# Patient Record
Sex: MALE | Race: WHITE | NOT HISPANIC OR LATINO | Employment: OTHER | URBAN - METROPOLITAN AREA
[De-identification: names, ages, dates, MRNs, and addresses within clinical notes are randomized per-mention and may not be internally consistent; named-entity substitution may affect disease eponyms.]

---

## 2023-01-23 ENCOUNTER — HOSPITAL ENCOUNTER (OUTPATIENT)
Facility: CLINIC | Age: 83
Setting detail: OBSERVATION
Discharge: HOME | End: 2023-01-24
Attending: EMERGENCY MEDICINE | Admitting: HOSPITALIST
Payer: MEDICARE

## 2023-01-23 DIAGNOSIS — R55 SYNCOPE, UNSPECIFIED SYNCOPE TYPE: ICD-10-CM

## 2023-01-23 DIAGNOSIS — R19.7 VOMITING AND DIARRHEA: ICD-10-CM

## 2023-01-23 DIAGNOSIS — R11.10 VOMITING AND DIARRHEA: ICD-10-CM

## 2023-01-23 LAB
ALBUMIN SERPL BCG-MCNC: 4.1 G/DL (ref 3.5–5.2)
ALP SERPL-CCNC: 109 U/L (ref 40–129)
ALT SERPL W P-5'-P-CCNC: 15 U/L (ref 10–50)
ANION GAP SERPL CALCULATED.3IONS-SCNC: 13 MMOL/L (ref 7–15)
AST SERPL W P-5'-P-CCNC: 23 U/L (ref 10–50)
BASOPHILS # BLD AUTO: 0 10E3/UL (ref 0–0.2)
BASOPHILS NFR BLD AUTO: 0 %
BILIRUB SERPL-MCNC: 0.6 MG/DL
BUN SERPL-MCNC: 23.7 MG/DL (ref 8–23)
CALCIUM SERPL-MCNC: 9 MG/DL (ref 8.8–10.2)
CHLORIDE SERPL-SCNC: 102 MMOL/L (ref 98–107)
CREAT SERPL-MCNC: 1.08 MG/DL (ref 0.67–1.17)
DEPRECATED HCO3 PLAS-SCNC: 23 MMOL/L (ref 22–29)
EOSINOPHIL # BLD AUTO: 0.1 10E3/UL (ref 0–0.7)
EOSINOPHIL NFR BLD AUTO: 1 %
ERYTHROCYTE [DISTWIDTH] IN BLOOD BY AUTOMATED COUNT: 13 % (ref 10–15)
FLUAV RNA SPEC QL NAA+PROBE: NEGATIVE
FLUBV RNA RESP QL NAA+PROBE: NEGATIVE
GFR SERPL CREATININE-BSD FRML MDRD: 68 ML/MIN/1.73M2
GLUCOSE SERPL-MCNC: 136 MG/DL (ref 70–99)
HCT VFR BLD AUTO: 43 % (ref 40–53)
HGB BLD-MCNC: 14 G/DL (ref 13.3–17.7)
IMM GRANULOCYTES # BLD: 0 10E3/UL
IMM GRANULOCYTES NFR BLD: 0 %
LIPASE SERPL-CCNC: 22 U/L (ref 13–60)
LYMPHOCYTES # BLD AUTO: 0.6 10E3/UL (ref 0.8–5.3)
LYMPHOCYTES NFR BLD AUTO: 5 %
MCH RBC QN AUTO: 30.2 PG (ref 26.5–33)
MCHC RBC AUTO-ENTMCNC: 32.6 G/DL (ref 31.5–36.5)
MCV RBC AUTO: 93 FL (ref 78–100)
MONOCYTES # BLD AUTO: 0.4 10E3/UL (ref 0–1.3)
MONOCYTES NFR BLD AUTO: 3 %
NEUTROPHILS # BLD AUTO: 10.7 10E3/UL (ref 1.6–8.3)
NEUTROPHILS NFR BLD AUTO: 91 %
NRBC # BLD AUTO: 0 10E3/UL
NRBC BLD AUTO-RTO: 0 /100
PLATELET # BLD AUTO: 147 10E3/UL (ref 150–450)
POTASSIUM SERPL-SCNC: 3.9 MMOL/L (ref 3.4–5.3)
PROT SERPL-MCNC: 6.8 G/DL (ref 6.4–8.3)
RBC # BLD AUTO: 4.64 10E6/UL (ref 4.4–5.9)
RSV RNA SPEC NAA+PROBE: NEGATIVE
SARS-COV-2 RNA RESP QL NAA+PROBE: NEGATIVE
SODIUM SERPL-SCNC: 138 MMOL/L (ref 136–145)
TROPONIN T SERPL HS-MCNC: 18 NG/L
WBC # BLD AUTO: 11.8 10E3/UL (ref 4–11)

## 2023-01-23 PROCEDURE — 96361 HYDRATE IV INFUSION ADD-ON: CPT

## 2023-01-23 PROCEDURE — 99285 EMERGENCY DEPT VISIT HI MDM: CPT | Mod: 25,CS

## 2023-01-23 PROCEDURE — 87637 SARSCOV2&INF A&B&RSV AMP PRB: CPT | Performed by: EMERGENCY MEDICINE

## 2023-01-23 PROCEDURE — 93005 ELECTROCARDIOGRAM TRACING: CPT

## 2023-01-23 PROCEDURE — C9803 HOPD COVID-19 SPEC COLLECT: HCPCS

## 2023-01-23 PROCEDURE — 83690 ASSAY OF LIPASE: CPT | Performed by: EMERGENCY MEDICINE

## 2023-01-23 PROCEDURE — C9113 INJ PANTOPRAZOLE SODIUM, VIA: HCPCS | Performed by: PHYSICIAN ASSISTANT

## 2023-01-23 PROCEDURE — 84484 ASSAY OF TROPONIN QUANT: CPT | Performed by: EMERGENCY MEDICINE

## 2023-01-23 PROCEDURE — 85025 COMPLETE CBC W/AUTO DIFF WBC: CPT | Performed by: EMERGENCY MEDICINE

## 2023-01-23 PROCEDURE — 250N000011 HC RX IP 250 OP 636: Performed by: PHYSICIAN ASSISTANT

## 2023-01-23 PROCEDURE — G0378 HOSPITAL OBSERVATION PER HR: HCPCS

## 2023-01-23 PROCEDURE — 258N000003 HC RX IP 258 OP 636: Performed by: PHYSICIAN ASSISTANT

## 2023-01-23 PROCEDURE — 96360 HYDRATION IV INFUSION INIT: CPT

## 2023-01-23 PROCEDURE — 99222 1ST HOSP IP/OBS MODERATE 55: CPT | Performed by: PHYSICIAN ASSISTANT

## 2023-01-23 PROCEDURE — 36415 COLL VENOUS BLD VENIPUNCTURE: CPT | Performed by: EMERGENCY MEDICINE

## 2023-01-23 PROCEDURE — 96374 THER/PROPH/DIAG INJ IV PUSH: CPT

## 2023-01-23 PROCEDURE — 258N000003 HC RX IP 258 OP 636: Performed by: EMERGENCY MEDICINE

## 2023-01-23 PROCEDURE — 80053 COMPREHEN METABOLIC PANEL: CPT | Performed by: EMERGENCY MEDICINE

## 2023-01-23 RX ORDER — SODIUM CHLORIDE 9 MG/ML
INJECTION, SOLUTION INTRAVENOUS CONTINUOUS
Status: DISCONTINUED | OUTPATIENT
Start: 2023-01-23 | End: 2023-01-23

## 2023-01-23 RX ORDER — PROCHLORPERAZINE MALEATE 5 MG
5 TABLET ORAL EVERY 6 HOURS PRN
Status: DISCONTINUED | OUTPATIENT
Start: 2023-01-23 | End: 2023-01-24 | Stop reason: HOSPADM

## 2023-01-23 RX ORDER — ATORVASTATIN CALCIUM 20 MG/1
20 TABLET, FILM COATED ORAL DAILY
COMMUNITY

## 2023-01-23 RX ORDER — SODIUM CHLORIDE 9 MG/ML
INJECTION, SOLUTION INTRAVENOUS CONTINUOUS
Status: ACTIVE | OUTPATIENT
Start: 2023-01-23 | End: 2023-01-24

## 2023-01-23 RX ORDER — LANOLIN ALCOHOL/MO/W.PET/CERES
1000 CREAM (GRAM) TOPICAL DAILY
COMMUNITY

## 2023-01-23 RX ORDER — ALBUTEROL SULFATE 90 UG/1
2 AEROSOL, METERED RESPIRATORY (INHALATION) EVERY 4 HOURS PRN
COMMUNITY

## 2023-01-23 RX ORDER — PROCHLORPERAZINE 25 MG
12.5 SUPPOSITORY, RECTAL RECTAL EVERY 12 HOURS PRN
Status: DISCONTINUED | OUTPATIENT
Start: 2023-01-23 | End: 2023-01-24 | Stop reason: HOSPADM

## 2023-01-23 RX ORDER — ASPIRIN 81 MG/1
81 TABLET ORAL DAILY
Status: DISCONTINUED | OUTPATIENT
Start: 2023-01-24 | End: 2023-01-24 | Stop reason: HOSPADM

## 2023-01-23 RX ORDER — ONDANSETRON 4 MG/1
4 TABLET, ORALLY DISINTEGRATING ORAL EVERY 6 HOURS PRN
Status: DISCONTINUED | OUTPATIENT
Start: 2023-01-23 | End: 2023-01-24 | Stop reason: HOSPADM

## 2023-01-23 RX ORDER — ONDANSETRON 2 MG/ML
4 INJECTION INTRAMUSCULAR; INTRAVENOUS EVERY 6 HOURS PRN
Status: DISCONTINUED | OUTPATIENT
Start: 2023-01-23 | End: 2023-01-24 | Stop reason: HOSPADM

## 2023-01-23 RX ORDER — AMLODIPINE BESYLATE 5 MG/1
10 TABLET ORAL DAILY
Status: DISCONTINUED | OUTPATIENT
Start: 2023-01-24 | End: 2023-01-24 | Stop reason: HOSPADM

## 2023-01-23 RX ORDER — LIDOCAINE 40 MG/G
CREAM TOPICAL
Status: DISCONTINUED | OUTPATIENT
Start: 2023-01-23 | End: 2023-01-24 | Stop reason: HOSPADM

## 2023-01-23 RX ORDER — AMLODIPINE BESYLATE 10 MG/1
10 TABLET ORAL DAILY
COMMUNITY

## 2023-01-23 RX ORDER — ATORVASTATIN CALCIUM 10 MG/1
20 TABLET, FILM COATED ORAL DAILY
Status: DISCONTINUED | OUTPATIENT
Start: 2023-01-24 | End: 2023-01-24 | Stop reason: HOSPADM

## 2023-01-23 RX ORDER — ASPIRIN 81 MG/1
81 TABLET ORAL DAILY
COMMUNITY

## 2023-01-23 RX ORDER — VITAMIN B COMPLEX
1 TABLET ORAL DAILY
COMMUNITY

## 2023-01-23 RX ADMIN — PANTOPRAZOLE SODIUM 40 MG: 40 INJECTION, POWDER, FOR SOLUTION INTRAVENOUS at 22:20

## 2023-01-23 RX ADMIN — SODIUM CHLORIDE 1000 ML: 9 INJECTION, SOLUTION INTRAVENOUS at 17:45

## 2023-01-23 RX ADMIN — SODIUM CHLORIDE: 9 INJECTION, SOLUTION INTRAVENOUS at 22:23

## 2023-01-23 ASSESSMENT — ACTIVITIES OF DAILY LIVING (ADL)
ADLS_ACUITY_SCORE: 35

## 2023-01-23 ASSESSMENT — ENCOUNTER SYMPTOMS
BLOOD IN STOOL: 0
DIARRHEA: 1
VOMITING: 1
FEVER: 0
HEADACHES: 0
NAUSEA: 1

## 2023-01-23 NOTE — ED PROVIDER NOTES
History   Chief Complaint:  Nausea, Vomiting, & Diarrhea     The history is provided by the patient.      Manfred Nava is a 82 year old male with a history of lung cancer, hypertension and hyperlipidemia who presents via with nausea, vomiting and diarrhea. The patient reports that today he was exercising at [id number] using free weights when he became lightheaded. He states that he then went upstairs, where he began having nausea and vomiting, as well as diarrhea. He reports that he had lunch with his wife at [employer] and was unable to eat much due to symptoms. He reports that his symptoms have worsened. He denies any recent travel, fever, any black or bloody stool/vomit, headache, chest pain, any recent antibiotics, or any suspicious foods. He denies that his wife is sick. He reports that he does not have a history of heart failure or Cdiff and is not on a water pill. The patient has a history of lung cancer. His last chemotherapy treatment was 3 months ago and he is now in remission. Prior to his arrival in the ED, EMS gave him 4 mg of Zofran and 100 ml of fluid.    ROS:  Review of Systems   Constitutional: Negative for fever.   Cardiovascular: Negative for chest pain.   Gastrointestinal: Positive for diarrhea, nausea and vomiting. Negative for blood in stool.   Neurological: Negative for headaches.   All other systems reviewed and are negative.    Allergies:  No Known Allergies     Medications:    Omeprazole  Atorvastatin  Amlodipine   Albuterol inhaler    Past Medical History:    Hyperlipidemia  Hypertension  Cancer of right upper lobe of lung   Vasovagal syncope  Osteopenia  Ascending aortic aneurysm   COPD  Melanoma  BPH  Esophageal reflux, Schatzki's ring  Paralysis of right vocal cord  Pharyngoesophageal dysphasia  CAD  Wenckebach second degree AV block    Past Surgical History:    Left knee arthroscopy   Appendectomy  Lung lobectomy, right upper lobe    Social History:  Presents alone  Presents via  EMS  Lives with his wife  PCP: No primary care provider on file.     Physical Exam     Patient Vitals for the past 24 hrs:   BP Temp Temp src Pulse Resp SpO2   01/23/23 1945 137/83 -- -- 66 21 95 %   01/23/23 1815 127/68 -- -- 62 19 98 %   01/23/23 1800 125/72 -- -- 63 19 98 %   01/23/23 1745 115/69 -- -- 63 15 --   01/23/23 1737 115/74 98.2  F (36.8  C) Oral 72 18 95 %      Physical Exam  VS: Reviewed per above  HENT: normal speech, no external signs head trauma.  EYES: sclera anicteric  CV: Rate as noted, regular rhythm.   RESP: Effort normal. Breath sounds are normal bilaterally.  GI: no tenderness/rebound/guarding, not distended.  NEURO: Alert, moving all extremities, GCS 15  MSK: No deformity of the extremities  SKIN: Warm and dry    Emergency Department Course   ECG  ECG taken at 1753, ECG read at 1754  Normal sinus rhythm  Incomplete right bundle branch block  Borderline ECG  Rate 63 bpm. OR interval 208 ms. QRS duration 92 ms. QT/QTc 430/440 ms. P-R-T axes 47 10 55.     Laboratory:  Labs Ordered and Resulted from Time of ED Arrival to Time of ED Departure   COMPREHENSIVE METABOLIC PANEL - Abnormal       Result Value    Sodium 138      Potassium 3.9      Chloride 102      Carbon Dioxide (CO2) 23      Anion Gap 13      Urea Nitrogen 23.7 (*)     Creatinine 1.08      Calcium 9.0      Glucose 136 (*)     Alkaline Phosphatase 109      AST 23      ALT 15      Protein Total 6.8      Albumin 4.1      Bilirubin Total 0.6      GFR Estimate 68     CBC WITH PLATELETS AND DIFFERENTIAL - Abnormal    WBC Count 11.8 (*)     RBC Count 4.64      Hemoglobin 14.0      Hematocrit 43.0      MCV 93      MCH 30.2      MCHC 32.6      RDW 13.0      Platelet Count 147 (*)     % Neutrophils 91      % Lymphocytes 5      % Monocytes 3      % Eosinophils 1      % Basophils 0      % Immature Granulocytes 0      NRBCs per 100 WBC 0      Absolute Neutrophils 10.7 (*)     Absolute Lymphocytes 0.6 (*)     Absolute Monocytes 0.4      Absolute  Eosinophils 0.1      Absolute Basophils 0.0      Absolute Immature Granulocytes 0.0      Absolute NRBCs 0.0     LIPASE - Normal    Lipase 22     TROPONIN T, HIGH SENSITIVITY - Normal    Troponin T, High Sensitivity 18     INFLUENZA A/B & SARS-COV2 PCR MULTIPLEX - Normal    Influenza A PCR Negative      Influenza B PCR Negative      RSV PCR Negative      SARS CoV2 PCR Negative        Emergency Department Course & Assessments:     Interventions:  Medications   0.9% sodium chloride BOLUS (0 mLs Intravenous Stopped 1/23/23 1930)     Followed by   sodium chloride 0.9% infusion (has no administration in time range)        Consultations/Discussion of Management or Tests:  1933 I talked to Corrine MELGAR accepting for Dr. Britt, who accepts the patient for admission.     Assessments:   ED Course as of 01/23/23 2052 Mon Jan 23, 2023 1731 I obtained history and examined the patient as noted above.    1913 I rechecked the patient and updated them on results.       Disposition:  The patient was admitted to the hospital under the care of Dr. Britt.     Impression & Plan    Medical Decision Making:  Patient presents to the ER for evaluation of syncopal episode in the setting of vomiting and diarrhea x1 day.  Vital signs reassuring.  No focal neurodeficits or external signs of head trauma.  Discussed neuroimaging with CT scan and patient declined citing low concern for head injury.  Labs are reassuring.  Symptoms improved after IV fluids and antiemetics.  Suspect vasovagal syncope in the setting of GI illness.  In discussion with his daughter, she reports that patient had history of profound pauses with previous GI illness and there was discussion of possible pacemaker.  Given advanced age and history of profound neurocardiogenic syncopal events in the setting of GI illness, plan to observe overnight to ensure that patient does not have recurrent syncope at home.    Diagnosis:    ICD-10-CM    1. Syncope, unspecified  syncope type  R55       2. Vomiting and diarrhea  R11.10     R19.7             Scribe Disclosure:  I, SVITLANA PERES, am serving as a scribe at 8:46 PM on 1/23/2023 to document services personally performed by Yury Quezada MD based on my observations and the provider's statements to me.     Scribe Disclosure:  I, Alcides Montano, am serving as a scribe at 8:52 PM on 1/23/2023 to document services personally performed by Yury Quezada MD based on my observations and the provider's statements to me.    1/23/2023   Yury Quezada MD Lindenbaum, Elan, MD  01/23/23 5745

## 2023-01-23 NOTE — ED TRIAGE NOTES
Patient presents from home via EMS with N/V/D. Patient started feeling lightheaded when he was working out this morning at 10 AM. Patient began feeling progressively worse after eating lunch. Patient was sitting on toilet when EMS arrived to his house, started to feel dizzy, became pale and diaphoretic. Per EMS patient heart rate dropped to the 40s. No LOC. Patient given 4mg Zofran en route. Pt c/o lightheadedness upon arrival. Pt denies recent abx use, illness, cardiac history. A&Ox4. VSS.     Triage Assessment     Row Name 01/23/23 4150       Triage Assessment (Adult)    Airway WDL WDL       Respiratory WDL    Respiratory WDL WDL       Skin Circulation/Temperature WDL    Skin Circulation/Temperature WDL WDL       Cognitive/Neuro/Behavioral WDL    Cognitive/Neuro/Behavioral WDL WDL

## 2023-01-24 ENCOUNTER — TELEPHONE (OUTPATIENT)
Dept: EMERGENCY MEDICINE | Facility: CLINIC | Age: 83
End: 2023-01-24
Payer: MEDICARE

## 2023-01-24 VITALS
SYSTOLIC BLOOD PRESSURE: 143 MMHG | TEMPERATURE: 98.4 F | RESPIRATION RATE: 16 BRPM | OXYGEN SATURATION: 95 % | DIASTOLIC BLOOD PRESSURE: 81 MMHG | HEART RATE: 69 BPM

## 2023-01-24 PROBLEM — R55 SYNCOPE, UNSPECIFIED SYNCOPE TYPE: Status: ACTIVE | Noted: 2023-01-24

## 2023-01-24 LAB
ANION GAP SERPL CALCULATED.3IONS-SCNC: 9 MMOL/L (ref 7–15)
ATRIAL RATE - MUSE: 63 BPM
BASOPHILS # BLD AUTO: 0 10E3/UL (ref 0–0.2)
BASOPHILS NFR BLD AUTO: 0 %
BUN SERPL-MCNC: 21.5 MG/DL (ref 8–23)
C COLI+JEJUNI+LARI FUSA STL QL NAA+PROBE: NOT DETECTED
CALCIUM SERPL-MCNC: 8.2 MG/DL (ref 8.8–10.2)
CHLORIDE SERPL-SCNC: 108 MMOL/L (ref 98–107)
CREAT SERPL-MCNC: 0.9 MG/DL (ref 0.67–1.17)
DEPRECATED HCO3 PLAS-SCNC: 22 MMOL/L (ref 22–29)
DIASTOLIC BLOOD PRESSURE - MUSE: NORMAL MMHG
EC STX1 GENE STL QL NAA+PROBE: NOT DETECTED
EC STX2 GENE STL QL NAA+PROBE: NOT DETECTED
EOSINOPHIL # BLD AUTO: 0 10E3/UL (ref 0–0.7)
EOSINOPHIL NFR BLD AUTO: 0 %
ERYTHROCYTE [DISTWIDTH] IN BLOOD BY AUTOMATED COUNT: 13.2 % (ref 10–15)
GFR SERPL CREATININE-BSD FRML MDRD: 85 ML/MIN/1.73M2
GLUCOSE SERPL-MCNC: 111 MG/DL (ref 70–99)
HCT VFR BLD AUTO: 39.8 % (ref 40–53)
HGB BLD-MCNC: 12.8 G/DL (ref 13.3–17.7)
IMM GRANULOCYTES # BLD: 0 10E3/UL
IMM GRANULOCYTES NFR BLD: 0 %
INTERPRETATION ECG - MUSE: NORMAL
LYMPHOCYTES # BLD AUTO: 0.4 10E3/UL (ref 0.8–5.3)
LYMPHOCYTES NFR BLD AUTO: 6 %
MCH RBC QN AUTO: 30.2 PG (ref 26.5–33)
MCHC RBC AUTO-ENTMCNC: 32.2 G/DL (ref 31.5–36.5)
MCV RBC AUTO: 94 FL (ref 78–100)
MONOCYTES # BLD AUTO: 0.3 10E3/UL (ref 0–1.3)
MONOCYTES NFR BLD AUTO: 5 %
NEUTROPHILS # BLD AUTO: 6.2 10E3/UL (ref 1.6–8.3)
NEUTROPHILS NFR BLD AUTO: 89 %
NOROV GI+II ORF1-ORF2 JNC STL QL NAA+PR: DETECTED
NRBC # BLD AUTO: 0 10E3/UL
NRBC BLD AUTO-RTO: 0 /100
P AXIS - MUSE: 47 DEGREES
PLATELET # BLD AUTO: 132 10E3/UL (ref 150–450)
POTASSIUM SERPL-SCNC: 4 MMOL/L (ref 3.4–5.3)
PR INTERVAL - MUSE: 208 MS
QRS DURATION - MUSE: 92 MS
QT - MUSE: 430 MS
QTC - MUSE: 440 MS
R AXIS - MUSE: 10 DEGREES
RBC # BLD AUTO: 4.24 10E6/UL (ref 4.4–5.9)
RVA NSP5 STL QL NAA+PROBE: NOT DETECTED
SALMONELLA SP RPOD STL QL NAA+PROBE: NOT DETECTED
SHIGELLA SP+EIEC IPAH STL QL NAA+PROBE: NOT DETECTED
SODIUM SERPL-SCNC: 139 MMOL/L (ref 136–145)
SYSTOLIC BLOOD PRESSURE - MUSE: NORMAL MMHG
T AXIS - MUSE: 55 DEGREES
V CHOL+PARA RFBL+TRKH+TNAA STL QL NAA+PR: NOT DETECTED
VENTRICULAR RATE- MUSE: 63 BPM
WBC # BLD AUTO: 7 10E3/UL (ref 4–11)
Y ENTERO RECN STL QL NAA+PROBE: NOT DETECTED

## 2023-01-24 PROCEDURE — G0378 HOSPITAL OBSERVATION PER HR: HCPCS

## 2023-01-24 PROCEDURE — 96376 TX/PRO/DX INJ SAME DRUG ADON: CPT

## 2023-01-24 PROCEDURE — 99239 HOSP IP/OBS DSCHRG MGMT >30: CPT | Performed by: INTERNAL MEDICINE

## 2023-01-24 PROCEDURE — 250N000013 HC RX MED GY IP 250 OP 250 PS 637: Performed by: STUDENT IN AN ORGANIZED HEALTH CARE EDUCATION/TRAINING PROGRAM

## 2023-01-24 PROCEDURE — 87506 IADNA-DNA/RNA PROBE TQ 6-11: CPT | Performed by: PHYSICIAN ASSISTANT

## 2023-01-24 PROCEDURE — 85025 COMPLETE CBC W/AUTO DIFF WBC: CPT | Performed by: PHYSICIAN ASSISTANT

## 2023-01-24 PROCEDURE — C9113 INJ PANTOPRAZOLE SODIUM, VIA: HCPCS | Performed by: PHYSICIAN ASSISTANT

## 2023-01-24 PROCEDURE — 250N000011 HC RX IP 250 OP 636: Performed by: PHYSICIAN ASSISTANT

## 2023-01-24 PROCEDURE — 36415 COLL VENOUS BLD VENIPUNCTURE: CPT | Performed by: PHYSICIAN ASSISTANT

## 2023-01-24 PROCEDURE — 250N000013 HC RX MED GY IP 250 OP 250 PS 637: Performed by: PHYSICIAN ASSISTANT

## 2023-01-24 PROCEDURE — 96361 HYDRATE IV INFUSION ADD-ON: CPT

## 2023-01-24 PROCEDURE — 80048 BASIC METABOLIC PNL TOTAL CA: CPT | Performed by: PHYSICIAN ASSISTANT

## 2023-01-24 RX ORDER — CALCIUM CARBONATE 500 MG/1
500 TABLET, CHEWABLE ORAL 3 TIMES DAILY PRN
Status: DISCONTINUED | OUTPATIENT
Start: 2023-01-24 | End: 2023-01-24 | Stop reason: HOSPADM

## 2023-01-24 RX ADMIN — ATORVASTATIN CALCIUM 20 MG: 10 TABLET, FILM COATED ORAL at 08:18

## 2023-01-24 RX ADMIN — AMLODIPINE BESYLATE 10 MG: 5 TABLET ORAL at 08:17

## 2023-01-24 RX ADMIN — ASPIRIN 81 MG: 81 TABLET, COATED ORAL at 08:18

## 2023-01-24 RX ADMIN — CALCIUM CARBONATE (ANTACID) CHEW TAB 500 MG 500 MG: 500 CHEW TAB at 02:32

## 2023-01-24 RX ADMIN — PANTOPRAZOLE SODIUM 40 MG: 40 INJECTION, POWDER, FOR SOLUTION INTRAVENOUS at 08:22

## 2023-01-24 ASSESSMENT — ACTIVITIES OF DAILY LIVING (ADL)
ADLS_ACUITY_SCORE: 35
ADLS_ACUITY_SCORE: 35
ADLS_ACUITY_SCORE: 37
ADLS_ACUITY_SCORE: 35
ADLS_ACUITY_SCORE: 35
ADLS_ACUITY_SCORE: 37
ADLS_ACUITY_SCORE: 35
ADLS_ACUITY_SCORE: 35

## 2023-01-24 NOTE — ED NOTES
[facility]  ED Nurse Handoff Report    Manfred Nava is a 82 year old male   ED Chief complaint: Nausea, Vomiting, & Diarrhea  . ED Diagnosis:   Final diagnoses:   Syncope, unspecified syncope type   Vomiting and diarrhea     Allergies: No Known Allergies    Code Status: Full Code  Activity level - Baseline/Home:  Independent. Activity Level - Current:   Assist X 1. Lift room needed: No. Bariatric: No   Needed: No   Isolation: Yes. Infection: Not Applicable  C-Diff Pending.     Vital Signs:   Vitals:    01/23/23 1745 01/23/23 1800 01/23/23 1815 01/23/23 1945   BP: 115/69 125/72 127/68 137/83   Pulse: 63 63 62 66   Resp: 15 19 19 21   Temp:       TempSrc:       SpO2:  98% 98% 95%       Cardiac Rhythm:  ,   Cardiac  Cardiac Rhythm: Normal sinus rhythm  Pain level:    Patient confused: No. Patient Falls Risk: Yes.   Elimination Status:  Has not voided in ED    Patient Report - Initial Complaint: N/V/D. Focused Assessment: Manfred Nava is a 82 year old male with a history of lung cancer, hypertension and hyperlipidemia who presents with nausea, vomiting and diarrhea. Symptoms began this morning while patient was lifting weights per his usual routine around 10 AM. Patient went to lunch with his wife and began feeling progressively worse. Diarrheas watery every 5 minutes. No bloody or black stool or bloody vomit per patient. Patient had episode of dizziness, near syncope at home when EMS arrived. Patient c/o lightheadedness upon arrival. Denies headache, chest pain. No recent abx use, illness, suspicious foods. Lives at home with wife who is not symptomatic. History of lung cancer, now in remission.  Tests Performed:   No orders to display    . Abnormal Results:   Labs Ordered and Resulted from Time of ED Arrival to Time of ED Departure   COMPREHENSIVE METABOLIC PANEL - Abnormal       Result Value    Sodium 138      Potassium 3.9      Chloride 102      Carbon Dioxide (CO2) 23      Anion Gap 13       Urea Nitrogen 23.7 (*)     Creatinine 1.08      Calcium 9.0      Glucose 136 (*)     Alkaline Phosphatase 109      AST 23      ALT 15      Protein Total 6.8      Albumin 4.1      Bilirubin Total 0.6      GFR Estimate 68     CBC WITH PLATELETS AND DIFFERENTIAL - Abnormal    WBC Count 11.8 (*)     RBC Count 4.64      Hemoglobin 14.0      Hematocrit 43.0      MCV 93      MCH 30.2      MCHC 32.6      RDW 13.0      Platelet Count 147 (*)     % Neutrophils 91      % Lymphocytes 5      % Monocytes 3      % Eosinophils 1      % Basophils 0      % Immature Granulocytes 0      NRBCs per 100 WBC 0      Absolute Neutrophils 10.7 (*)     Absolute Lymphocytes 0.6 (*)     Absolute Monocytes 0.4      Absolute Eosinophils 0.1      Absolute Basophils 0.0      Absolute Immature Granulocytes 0.0      Absolute NRBCs 0.0     LIPASE - Normal    Lipase 22     TROPONIN T, HIGH SENSITIVITY - Normal    Troponin T, High Sensitivity 18     INFLUENZA A/B & SARS-COV2 PCR MULTIPLEX    .   Treatments provided: See MAR  Family Comments: Wife, daughter at bedside  OBS brochure/video discussed/provided to patient:  Yes  ED Medications:   Medications   0.9% sodium chloride BOLUS (0 mLs Intravenous Stopped 1/23/23 1930)     Followed by   sodium chloride 0.9% infusion (has no administration in time range)     Drips infusing:  No  For the majority of the shift, the patient's behavior Green. Interventions performed were N/A.    Sepsis treatment initiated: No     Patient tested for COVID 19 prior to admission: YES - pending    ED Nurse Name/Phone Number: Verna Garcia RN,   8:03 PM    RECEIVING UNIT ED HANDOFF REVIEW    Above ED Nurse Handoff Report was reviewed: Yes  Reviewed by: Annie Gonzalez RN on January 24, 2023 at 2:36 PM

## 2023-01-24 NOTE — PLAN OF CARE
Care from [id number]    Blood pressure 128/74, pulse 68, temperature 98.5  F (36.9  C), temperature source Oral, resp. rate 15, SpO2 96 %.    Admit Date: 1/23/23  Admitting Diagnosis: Vomiting and diarrhea  Neuro: Alert and Oriented x4  Activity: have not been out of bed yet  Telemetry Monitoring: Yes - a  Pain: denying pain.  Labs / Tests:   GI: pt had no diarrhea until 0530. Pt since has had 2 loose/watery BMs 1/24/23. C. Diff sample was sent at 0700  : Voiding, using Bedside urinal   Fluids: has Normal Saline 0.9% running at 100 mL per hour.  Diet: Regular  O2: RA  Consults:   Treatment:  Discharge Disposition: TBD From home with wife    Pt complaining of acid reflux, order for tums obtained and provided.

## 2023-01-24 NOTE — PHARMACY-ADMISSION MEDICATION HISTORY
Admission medication history interview status for this patient is complete. See [facility] admission navigator for allergy information, prior to admission medications and immunization status.     Medication history interview done, indicate source(s): Patient  Medication history resources (including written lists, pill bottles, clinic record): [facility] ([facility]) records, patient's outpatient pharmacy  Pharmacy: [place] [id number] [facility] [id number] [address]      Changes made to PTA medication list:  Added:     Rx:     Albuterol inhaler PRN    Amlodipine    Aspirin    Atorvastatin    OTC:    Vitamin B-12    Vitamin D3  Changed: none  Reported as Not Taking: none  Removed: simvastatin (pt taking atorvastatin instead)    Actions taken by pharmacist (provider contacted, etc):None     Additional medication history information:None    Medication reconciliation/reorder completed by provider prior to medication history?  N   (Y/N)       Medication Affordability:  1. Not including over the counter (OTC) medications, was there a time in the past 12 months when you did not take your medications as prescribed because of cost?  -no    Prior to Admission medications    Medication Sig Last Dose Taking? Auth Provider Long Term End Date   albuterol (PROAIR HFA/PROVENTIL HFA/VENTOLIN HFA) 108 (90 Base) MCG/ACT inhaler Inhale 2 puffs into the lungs every 4 hours as needed for shortness of breath, wheezing or cough More than a month Yes Unknown, Entered By History Yes    amLODIPine (NORVASC) 10 MG tablet Take 10 mg by mouth daily 1/23/2023 at AM Yes Unknown, Entered By History Yes    aspirin 81 MG EC tablet Take 81 mg by mouth daily 1/23/2023 at AM Yes Unknown, Entered By History     atorvastatin (LIPITOR) 20 MG tablet Take 20 mg by mouth daily 1/23/2023 at AM Yes Unknown, Entered By History Yes    cyanocobalamin (VITAMIN B-12) 1000 MCG tablet Take 1,000 mcg by mouth daily 1/23/2023 at AM Yes Unknown, Entered By  History     Vitamin D3 (CHOLECALCIFEROL) 25 mcg (1000 units) tablet Take 1 tablet by mouth daily 1/23/2023 at AM Yes Unknown, Entered By History       Nicollette McMann, PharmD, BCPS  [phone] (Med Reconciliation [facility] phone)

## 2023-01-24 NOTE — H&P
[facility]    History and Physical - Hospitalist Service       Date of Admission:  1/23/2023    Assessment & Plan      Manfred Nava is a 82 year old male with PMHx significant for COPD, ascending aortic aneurysm, nonobstructive CAD, vasovagal syncope, bradycardia with hx of pauses, intermittent Wenckebach heart block, HTN, HLP, BPH, s/p R upper lung wedge resection due to adenocarcinoma, and former tobacco use, who was admitted on 1/23/2023 with N/V/D. He also had a near syncope vs syncopal episode at home. EMS noted bradycardia into the 40s.    1. Gastroenteritis  Likely viral process. Developed sx this AM, nausea and vomiting x2, multiple loose watery stools today, no blood, no fevers. Syncope vs near syncope while at toilet.  Pt thinks its due to [employer] food.  - admit to OBS  - enteric panel  - IVFs NS @ 100 ml/hr  - regular diet as tolerated  - supportive cares  - IV Protonix BID, pt complains of heart burn    2. Hx of bradycardia with symptomatic pauses  Hx of vasovagal syncope with possible episode today  Hx of Wenckebach heart block  Pt has hx of syncope related to bradycardia and long pauses. Has noted Wenckebach heart block on cardiac event monitor and also while hospitalized. Followed up with Cardiology, discussed pacemaker but did not feel like he quite warranted one yet.   ED provider concerned with hx of vasovagal syncope and ongoing N/V/D due to #1.  - monitor sx here  - monitor on tele    3. COPD  S/p R upper lung wedge resection due to adenocarcinoma  Completed chemotherapy 3 months ago per pt.   Has prn albuterol inhaler, may use own home med  4. Ascending aortic aneurysm  Nonobstructive CAD  HTN  Resume home amlodipine with parameters  5. HLP  Resume home statin  6. BPH  No longer on Flomax  7. Former tobacco use     Diet:   regular diet as tolerated  DVT Prophylaxis: Low Risk/Ambulatory with no VTE prophylaxis indicated  Byers Catheter: Not present  Lines: None      Cardiac Monitoring: None  Code Status:   Full code     Clinically Significant Risk Factors Present on Admission                               Disposition Plan      Expected Discharge Date: 01/24/2023                The patient's care was discussed with the Patient and ED provider, Dr. Quezada.    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  He states he developed symptoms earlier today after eating [employer].  He notes multiple loose watery stools today and 2 episodes of vomiting.  He denies blood in his vomit or stool.  He denies fever, chills, chest pain, shortness of breath, dull pain, cramping, or dysuria.  He denies anyone else around him being sick.  He notes that he had a syncopal versus near syncopal episode while at the toilet today.  He notes a history of bradycardia with pauses and has discussed a pacemaker with cardiology in the past but they did not think he needed one quite yet.  In the ED, vital signs are stable.  CMP is fairly unremarkable, BUN is slightly elevated.  Lipase and troponin are within normal limits.  CBC is significant for WBC of 11.8, otherwise.  COVID influenza are negative.  EKG shows sinus rhythm with incomplete RBBB.  He was given 1 L normal saline bolus in the ED.  He will be admitted to OBS for further management.      Past Medical History     Past Medical History:   Diagnosis Date     Hyperlipaemia      Hypertension      Malignant melanoma (H)        Past Surgical History   R lung wedge resection     Prior to Admission Medications   Prior to Admission Medications   Prescriptions Last Dose Informant Patient Reported? Taking?   Vitamin D3 (CHOLECALCIFEROL) 25 mcg (1000 units) tablet 1/23/2023 at AM Self Yes Yes   Sig: Take 1 tablet by mouth daily   albuterol (PROAIR HFA/PROVENTIL HFA/VENTOLIN HFA) 108 (90 Base) MCG/ACT inhaler More than a month Self Yes Yes   Sig: Inhale 2 puffs into the lungs every 4 hours as needed for shortness of breath, wheezing or cough   amLODIPine (NORVASC) 10 MG tablet 1/23/2023 at AM Self Yes Yes   Sig: Take 10 mg by mouth daily   aspirin 81 MG EC tablet 1/23/2023 at AM Self Yes Yes   Sig: Take 81 mg by mouth daily   atorvastatin (LIPITOR) 20 MG tablet 1/23/2023 at AM Self Yes Yes   Sig: Take 20 mg by mouth daily   cyanocobalamin (VITAMIN B-12) 1000 MCG tablet 1/23/2023 at AM Self Yes Yes   Sig: Take 1,000 mcg by mouth daily      Facility-Administered Medications: None           Physical Exam   Vital Signs: Temp: 98.2  F (36.8  C) Temp src: Oral BP: 137/83 Pulse: 66   Resp: 21 SpO2: 95 % O2 Device: None (Room air)    Weight: 0 lbs 0 oz    GENERAL:  Comfortable.  PSYCH: pleasant, oriented, No acute distress.  HEENT:  Atraumatic, normocephalic. Normal conjunctiva, normal hearing, and oropharynx is normal.  NECK:  Supple, no neck vein distention  HEART:  Normal S1, S2 with no murmur, no pericardial rub, gallops or S3 or S4.  LUNGS:  Clear to auscultation, normal Respiratory effort. No wheezing, rales or ronchi.  GI:  Soft, normal bowel sounds. Non-tender, non distended.   EXTREMITIES:  No pedal edema, +2 pulses bilateral and equal.  SKIN:  Dry to touch, No rash, wound or ulcerations.  NEUROLOGIC:  CN 2-12 intact, BL 5/5 symmetric upper and lower extremity strength, sensation is intact with no focal deficits.      Medical Decision  Making       60 MINUTES SPENT BY ME on the date of service doing chart review, history, exam, documentation & further activities per the note.      Data     I have personally reviewed the following data over the past 24 hrs:    11.8 (H)  \   14.0   / 147 (L)     138 102 23.7 (H) /  136 (H)   3.9 23 1.08 \       ALT: 15 AST: 23 AP: 109 TBILI: 0.6   ALB: 4.1 TOT PROTEIN: 6.8 LIPASE: 22       Trop: 18 BNP: N/A       Imaging results reviewed over the past 24 hrs:   No results found for this or any previous visit (from the past 24 hour(s)).

## 2023-01-24 NOTE — DISCHARGE SUMMARY
[facility] Discharge Summary    Manfred Nava MRN# [id number]   Age: 82 year old YOB: 1940     Date of Admission:  1/23/2023  Date of Discharge::  1/24/2023  3:27 PM  Admitting Physician:  Jd Britt MD  Discharge Physician:  Dionisio Bhatia MD     Home clinic: [facility]          Admission Diagnoses:   Vomiting and diarrhea [R11.10, R19.7]          Discharge Diagnosis:   Principal Problem:    Syncope, unspecified syncope type  Active Problems:    Vomiting and diarrhea            Procedures:   None          Discharge Medications:     Discharge Medication List as of 1/24/2023  3:02 PM      CONTINUE these medications which have NOT CHANGED    Details   albuterol (PROAIR HFA/PROVENTIL HFA/VENTOLIN HFA) 108 (90 Base) MCG/ACT inhaler Inhale 2 puffs into the lungs every 4 hours as needed for shortness of breath, wheezing or cough, Historical      amLODIPine (NORVASC) 10 MG tablet Take 10 mg by mouth daily, Historical      aspirin 81 MG EC tablet Take 81 mg by mouth daily, Historical      atorvastatin (LIPITOR) 20 MG tablet Take 20 mg by mouth daily, Historical      cyanocobalamin (VITAMIN B-12) 1000 MCG tablet Take 1,000 mcg by mouth daily, Historical      Vitamin D3 (CHOLECALCIFEROL) 25 mcg (1000 units) tablet Take 1 tablet by mouth daily, Historical                   Consultations:   I spoke briefly by telephone with Dr. Mcelroy from [facility].  She offered to communicate directly with the patient to set up follow up that includes a heart rate monitor.          Hospital Course:   Mr. Nava had been admitted by my colleague overnight following an episode of apparent loss of consciousness.  He has a history of pathological bradycardia including apparent episodes of [place] that were recorded on remote Zio patch monitoring.  He follows with Dr. Lopez from the [facility] and had not really not followed up since the evaluation in  2019.    Initially, the there was concern that the patient again had a rhythm disturbance which caused him to pass out.  The story as I understand he goes as follows: Mr. Nava was feeling ill before he went out to a Mexican restaurant on the evening of 1/23/23.  They brought the food home and he had eaten less than his entire portion when he started to have diarrhea.  He estimates having had about 5 or 6 episodes.  Following that, he laid down in bed to take a nap.  After little while, he abruptly became nauseated and jumped up to run to the toilet in order to vomit.  He did in fact vomit but shortly after that he evidently lost consciousness.  His wife found him with his hand in the toilet and leaning over the toilet bowl.  He also had lost stool continence on himself.  It was for those reasons the patient's wife called the EMS.  The patient does not recall the events between running to the toilet and having the EMS present.    I indicated to the family that it was at least as likely that the patient had a simple vasovagal event as that he had some type of rhythm disturbance which caused the LOC.  Nevertheless, it was most appropriate to redirect the patient back to his primary cardiologist for further evaluation as had previously been considered.    BP (!) 154/80 (BP Location: Right arm, Patient Position: Semi-Sidhu's, Cuff Size: Adult Regular)   Pulse 72   Temp 98.4  F (36.9  C) (Oral)   Resp 16   SpO2 97%   On the date of discharge, I saw Mr. Nava in the emergency department while his wife was seated next to him.  He had spent the night in the ED due to bed shortage.  Patient was feeling quite well and had not had any recorded bradycardic episodes or loss of consciousness overnight.  I got him up to ambulate and he remained in what appears to have been a sinus rhythm throughout the couple of minutes while he walked around the emergency department.  General: Brandy is a pleasant, coherent elderly male in no  apparent distress.  HEENT: No focal muscular asymmetry.  Neck: No remarkable cervical or supraclavicular lymphadenopathy or thyromegaly.  Chest: Clear to auscultation in all fields.  No increased work of breathing.  Heart: Regular rate and rhythm without appreciable murmur.  Abdomen: Soft and benign.  Extremities: No remarkable pitting edema noted over the ankles bilaterally.  Neurologic: Patient ambulates without assistive devices and without apparent gait instability or ataxia.          Discharge Instructions and Follow-Up:   Discharge diet: Regular   Discharge activity: Activity as tolerated   Discharge follow-up: Follow up with [facility] as planned.            Discharge Disposition:   Discharged to home      Attestation:  I have reviewed today's vital signs, notes, medications, labs and imaging.  Greater than 40 minutes spent with this patient, evaluating him, reviewing the chart and contacting [facility] directly by telephone.    Dionisio Bhatia MD

## 2023-01-24 NOTE — PLAN OF CARE
Goal Outcome Evaluation:      Plan of Care Reviewed With: patient     A&O x 4. VSS. Up independently. On room air. Denies pain, SOB. Or nausea.

## 2023-01-24 NOTE — PROGRESS NOTES
Patient's After Visit Summary was reviewed with patient and/or significant other.   Patient verbalized understanding of After Visit Summary, recommended follow up and was given an opportunity to ask questions.   Discharge medications sent home with patient/family: YES, No, Not applicable   Discharged with spouse    Pt discharged at 1505 with all his belongings, and paperwork.